# Patient Record
Sex: FEMALE | Race: WHITE | Employment: UNEMPLOYED | URBAN - METROPOLITAN AREA
[De-identification: names, ages, dates, MRNs, and addresses within clinical notes are randomized per-mention and may not be internally consistent; named-entity substitution may affect disease eponyms.]

---

## 2018-07-12 ENCOUNTER — APPOINTMENT (OUTPATIENT)
Dept: GENERAL RADIOLOGY | Age: 23
End: 2018-07-12

## 2018-07-12 ENCOUNTER — HOSPITAL ENCOUNTER (EMERGENCY)
Age: 23
Discharge: HOME | End: 2018-07-12

## 2018-07-12 VITALS
SYSTOLIC BLOOD PRESSURE: 129 MMHG | HEART RATE: 83 BPM | WEIGHT: 175 LBS | HEIGHT: 66 IN | BODY MASS INDEX: 28.12 KG/M2 | OXYGEN SATURATION: 100 % | DIASTOLIC BLOOD PRESSURE: 80 MMHG | TEMPERATURE: 98.4 F | RESPIRATION RATE: 18 BRPM

## 2018-07-12 DIAGNOSIS — S93.401A SPRAIN OF RIGHT ANKLE, UNSPECIFIED LIGAMENT, INITIAL ENCOUNTER: Primary | ICD-10-CM

## 2018-07-12 PROCEDURE — 73630 X-RAY EXAM OF FOOT: CPT

## 2018-07-12 PROCEDURE — 6370000000 HC RX 637 (ALT 250 FOR IP): Performed by: PHYSICIAN ASSISTANT

## 2018-07-12 PROCEDURE — 73610 X-RAY EXAM OF ANKLE: CPT

## 2018-07-12 PROCEDURE — 99283 EMERGENCY DEPT VISIT LOW MDM: CPT

## 2018-07-12 RX ORDER — IBUPROFEN 800 MG/1
800 TABLET ORAL EVERY 6 HOURS PRN
Qty: 20 TABLET | Refills: 0 | Status: SHIPPED | OUTPATIENT
Start: 2018-07-12 | End: 2018-07-17

## 2018-07-12 RX ORDER — IBUPROFEN 800 MG/1
800 TABLET ORAL ONCE
Status: COMPLETED | OUTPATIENT
Start: 2018-07-12 | End: 2018-07-12

## 2018-07-12 RX ADMIN — IBUPROFEN 800 MG: 800 TABLET ORAL at 13:47

## 2018-07-12 ASSESSMENT — PAIN DESCRIPTION - LOCATION: LOCATION: ANKLE

## 2018-07-12 ASSESSMENT — PAIN DESCRIPTION - ORIENTATION: ORIENTATION: RIGHT

## 2018-07-12 ASSESSMENT — PAIN DESCRIPTION - PAIN TYPE: TYPE: ACUTE PAIN

## 2018-07-12 ASSESSMENT — PAIN SCALES - GENERAL
PAINLEVEL_OUTOF10: 8
PAINLEVEL_OUTOF10: 8

## 2018-07-12 NOTE — ED PROVIDER NOTES
Tenderness:  moderate. Swelling: Moderate. Deformity: no.             ROM: full range with pain. Skin:  Ecchymosis present overlying the swelling. Neurovascular: Motor deficit: none. Sensory deficit:   none. Pulse deficit: none. Capillary refill: normal.  Foot:              Tenderness:  Mild over the 5th metatarsal.              Swelling: None. Deformity: no.             ROM: full range of motion. Skin:  no erythema, rash or wounds noted. Lymphatics: No lymphangitis or adenopathy noted. Neurological:  Oriented. Motor functions intact. Lab / Imaging Results   (All laboratory and radiology results have been personally reviewed by myself)  Labs:  No results found for this visit on 07/12/18. Imaging: All Radiology results interpreted by Radiologist unless otherwise noted. XR FOOT RIGHT (MIN 3 VIEWS)   Final Result   1. No acute osseous abnormality. XR ANKLE RIGHT (MIN 3 VIEWS)   Final Result   1. No acute osseous abnormality. Soft tissue swelling overlying the   lateral malleolus. ED Course / Medical Decision Making     Medications   ibuprofen (ADVIL;MOTRIN) tablet 800 mg (800 mg Oral Given 7/12/18 1347)        Consults:   None    Procedure(s):   none    MDM:       Films were obtained and negative for fracture. Likely sprain. We discussed RICE and pt was given ACE wrap and crutches. Plan is subsequently for symptom control, limited use and  immobilization with appropriate outpatient follow-up. Counseling: The emergency provider has spoken with the patient and discussed todays results, in addition to providing specific details for the plan of care and counseling regarding the diagnosis and prognosis. Questions are answered at this time and they are agreeable with the plan. Assessment      1.  Sprain of right ankle, unspecified ligament, initial encounter Plan   Discharge to home  Patient condition is stable    New Medications     Discharge Medication List as of 7/12/2018  2:14 PM        Electronically signed by Inderjit Gregg PA-C   DD: 7/12/18  **This report was transcribed using voice recognition software. Every effort was made to ensure accuracy; however, inadvertent computerized transcription errors may be present.   END OF ED PROVIDER NOTE        Deloris Rosa PA-C  07/12/18 [id number]